# Patient Record
Sex: MALE | Race: WHITE | NOT HISPANIC OR LATINO | Employment: OTHER | URBAN - METROPOLITAN AREA
[De-identification: names, ages, dates, MRNs, and addresses within clinical notes are randomized per-mention and may not be internally consistent; named-entity substitution may affect disease eponyms.]

---

## 2017-03-31 PROBLEM — R91.8 PULMONARY NODULES: Status: ACTIVE | Noted: 2017-03-31

## 2017-08-16 PROBLEM — I48.91 A-FIB: Chronic | Status: ACTIVE | Noted: 2017-08-16

## 2017-08-16 PROBLEM — R93.89 ABNORMAL CT OF THE CHEST: Status: ACTIVE | Noted: 2017-08-16

## 2017-08-16 PROBLEM — J94.8 PLEURAL CALCIFICATION: Status: ACTIVE | Noted: 2017-08-16

## 2017-08-16 PROBLEM — I25.10 CORONARY ATHEROSCLEROSIS OF UNSPECIFIED TYPE OF VESSEL, NATIVE OR GRAFT: Status: ACTIVE | Noted: 2017-05-17

## 2018-11-20 PROBLEM — I65.23 BILATERAL CAROTID ARTERY STENOSIS: Status: ACTIVE | Noted: 2018-11-20

## 2018-11-20 PROBLEM — R42 VERTIGO: Status: ACTIVE | Noted: 2018-11-20

## 2018-11-20 PROBLEM — Z98.890 HISTORY OF COMMON CAROTID ARTERY STENT PLACEMENT: Status: ACTIVE | Noted: 2018-11-20

## 2018-11-20 PROBLEM — G25.2 INTENTION TREMOR: Status: ACTIVE | Noted: 2018-11-20

## 2018-11-20 PROBLEM — Z95.828 HISTORY OF COMMON CAROTID ARTERY STENT PLACEMENT: Status: ACTIVE | Noted: 2018-11-20

## 2019-05-21 PROBLEM — Z79.899 ON STATIN THERAPY: Status: ACTIVE | Noted: 2019-05-21

## 2019-05-21 PROBLEM — Z79.01 ANTICOAGULATED BY ANTICOAGULATION TREATMENT: Status: ACTIVE | Noted: 2019-05-21

## 2019-05-24 PROBLEM — R74.02 NONSPECIFIC ELEVATION OF LEVELS OF TRANSAMINASE OR LACTIC ACID DEHYDROGENASE (LDH): Status: ACTIVE | Noted: 2019-05-24

## 2019-05-24 PROBLEM — R74.01 NONSPECIFIC ELEVATION OF LEVELS OF TRANSAMINASE OR LACTIC ACID DEHYDROGENASE (LDH): Status: ACTIVE | Noted: 2019-05-24

## 2019-12-30 PROBLEM — C44.209: Status: ACTIVE | Noted: 2019-12-30

## 2019-12-30 PROBLEM — H90.6 MIXED CONDUCTIVE AND SENSORINEURAL HEARING LOSS OF BOTH EARS: Status: ACTIVE | Noted: 2019-12-30

## 2020-03-11 ENCOUNTER — OFFICE VISIT (OUTPATIENT)
Dept: URGENT CARE | Facility: CLINIC | Age: 80
End: 2020-03-11
Payer: MEDICARE

## 2020-03-11 VITALS
HEIGHT: 70 IN | RESPIRATION RATE: 16 BRPM | OXYGEN SATURATION: 96 % | SYSTOLIC BLOOD PRESSURE: 144 MMHG | TEMPERATURE: 97 F | DIASTOLIC BLOOD PRESSURE: 82 MMHG | WEIGHT: 215 LBS | BODY MASS INDEX: 30.78 KG/M2 | HEART RATE: 97 BPM

## 2020-03-11 DIAGNOSIS — J06.9 UPPER RESPIRATORY TRACT INFECTION, UNSPECIFIED TYPE: Primary | ICD-10-CM

## 2020-03-11 PROCEDURE — 96372 THER/PROPH/DIAG INJ SC/IM: CPT | Mod: S$GLB,,, | Performed by: INTERNAL MEDICINE

## 2020-03-11 PROCEDURE — 99214 PR OFFICE/OUTPT VISIT, EST, LEVL IV, 30-39 MIN: ICD-10-PCS | Mod: 25,S$GLB,, | Performed by: INTERNAL MEDICINE

## 2020-03-11 PROCEDURE — 99214 OFFICE O/P EST MOD 30 MIN: CPT | Mod: 25,S$GLB,, | Performed by: INTERNAL MEDICINE

## 2020-03-11 PROCEDURE — 96372 PR INJECTION,THERAP/PROPH/DIAG2ST, IM OR SUBCUT: ICD-10-PCS | Mod: S$GLB,,, | Performed by: INTERNAL MEDICINE

## 2020-03-11 RX ORDER — DEXAMETHASONE SODIUM PHOSPHATE 100 MG/10ML
10 INJECTION INTRAMUSCULAR; INTRAVENOUS
Status: COMPLETED | OUTPATIENT
Start: 2020-03-11 | End: 2020-03-11

## 2020-03-11 RX ORDER — PROMETHAZINE HYDROCHLORIDE AND DEXTROMETHORPHAN HYDROBROMIDE 6.25; 15 MG/5ML; MG/5ML
5 SYRUP ORAL 3 TIMES DAILY
Qty: 105 ML | Refills: 0 | Status: SHIPPED | OUTPATIENT
Start: 2020-03-11 | End: 2020-03-18

## 2020-03-11 RX ADMIN — DEXAMETHASONE SODIUM PHOSPHATE 10 MG: 100 INJECTION INTRAMUSCULAR; INTRAVENOUS at 01:03

## 2020-03-11 NOTE — PROGRESS NOTES
"Subjective:       Patient ID: Flaco Lucio Jr. is a 80 y.o. male.    Vitals:  height is 5' 10" (1.778 m) and weight is 97.5 kg (215 lb). His temperature is 97.1 °F (36.2 °C). His blood pressure is 144/82 (abnormal) and his pulse is 97. His respiration is 16 and oxygen saturation is 96%.     Chief Complaint: Sinus Problem    Pt c/o sinus symptoms x 4 days.     Sinus Problem   This is a new problem. The current episode started in the past 7 days. The problem is unchanged. There has been no fever. Associated symptoms include congestion, sinus pressure and sneezing. Pertinent negatives include no chills, coughing, diaphoresis, ear pain, shortness of breath or sore throat. Past treatments include nothing. The treatment provided no relief.       Constitution: Negative for chills, sweating, fatigue and fever.   HENT: Positive for congestion and sinus pressure. Negative for ear pain, sinus pain, sore throat and voice change.    Neck: Negative for painful lymph nodes.   Eyes: Negative for eye redness.   Respiratory: Negative for chest tightness, cough, sputum production, bloody sputum, COPD, shortness of breath, stridor, wheezing and asthma.    Gastrointestinal: Negative for nausea and vomiting.   Musculoskeletal: Negative for muscle ache.   Skin: Negative for rash.   Allergic/Immunologic: Positive for seasonal allergies and sneezing. Negative for asthma.   Hematologic/Lymphatic: Negative for swollen lymph nodes.       Objective:      Physical Exam   Constitutional: He is oriented to person, place, and time. He appears well-developed and well-nourished. He is cooperative.  Non-toxic appearance. He does not appear ill. No distress.   HENT:   Head: Normocephalic and atraumatic.   Right Ear: Hearing, tympanic membrane, external ear and ear canal normal.   Left Ear: Hearing, tympanic membrane, external ear and ear canal normal.   Nose: Rhinorrhea present. No mucosal edema or nasal deformity. No epistaxis. Right sinus exhibits " no maxillary sinus tenderness and no frontal sinus tenderness. Left sinus exhibits no maxillary sinus tenderness and no frontal sinus tenderness.   Mouth/Throat: Uvula is midline and mucous membranes are normal. No trismus in the jaw. Normal dentition. No uvula swelling. Posterior oropharyngeal erythema present.   Eyes: Conjunctivae and lids are normal. Right eye exhibits no discharge. Left eye exhibits no discharge. No scleral icterus.   Neck: Trachea normal, normal range of motion, full passive range of motion without pain and phonation normal. Neck supple.   Cardiovascular: Normal rate, regular rhythm, normal heart sounds, intact distal pulses and normal pulses.   Pulmonary/Chest: Effort normal and breath sounds normal. No respiratory distress.   Abdominal: Soft. Normal appearance and bowel sounds are normal. He exhibits no distension, no pulsatile midline mass and no mass. There is no tenderness.   Musculoskeletal: Normal range of motion. He exhibits no edema or deformity.   Neurological: He is alert and oriented to person, place, and time. He exhibits normal muscle tone. Coordination normal.   Skin: Skin is warm, dry, intact, not diaphoretic and not pale.   Psychiatric: He has a normal mood and affect. His speech is normal and behavior is normal. Judgment and thought content normal. Cognition and memory are normal.   Nursing note and vitals reviewed.        Assessment:       1. Upper respiratory tract infection, unspecified type        Plan:         Upper respiratory tract infection, unspecified type  -     dexamethasone injection 10 mg  -     promethazine-dextromethorphan (PROMETHAZINE-DM) 6.25-15 mg/5 mL Syrp; Take 5 mLs by mouth 3 (three) times daily. for 7 days  Dispense: 105 mL; Refill: 0      Patient Instructions     Viral Upper Respiratory Illness (Adult)  You have a viral upper respiratory illness (URI), which is another term for the common cold. This illness is contagious during the first few days. It  is spread through the air by coughing and sneezing. It may also be spread by direct contact (touching the sick person and then touching your own eyes, nose, or mouth). Frequent handwashing will decrease risk of spread. Most viral illnesses go away within 7 to 10 days with rest and simple home remedies. Sometimes the illness may last for several weeks. Antibiotics will not kill a virus, and they are generally not prescribed for this condition.    Home care  · If symptoms are severe, rest at home for the first 2 to 3 days. When you resume activity, don't let yourself get too tired.  · Avoid being exposed to cigarette smoke (yours or others).  · You may use acetaminophen or ibuprofen to control pain and fever, unless another medicine was prescribed. (Note: If you have chronic liver or kidney disease, have ever had a stomach ulcer or gastrointestinal bleeding, or are taking blood-thinning medicines, talk with your healthcare provider before using these medicines.) Aspirin should never be given to anyone under 18 years of age who is ill with a viral infection or fever. It may cause severe liver or brain damage.  · Your appetite may be poor, so a light diet is fine. Avoid dehydration by drinking 6 to 8 glasses of fluids per day (water, soft drinks, juices, tea, or soup). Extra fluids will help loosen secretions in the nose and lungs.  · Over-the-counter cold medicines will not shorten the length of time youre sick, but they may be helpful for the following symptoms: cough, sore throat, and nasal and sinus congestion. (Note: Do not use decongestants if you have high blood pressure.)  Follow-up care  Follow up with your healthcare provider, or as advised.  When to seek medical advice  Call your healthcare provider right away if any of these occur:  · Cough with lots of colored sputum (mucus)  · Severe headache; face, neck, or ear pain  · Difficulty swallowing due to throat pain  · Fever of 100.4°F (38°C)  Call 911, or get  immediate medical care  Call emergency services right away if any of these occur:  · Chest pain, shortness of breath, wheezing, or difficulty breathing  · Coughing up blood  · Inability to swallow due to throat pain  Date Last Reviewed: 9/13/2015  © 6483-2228 [employer]. [address] [place] [address]. All rights reserved. This information is not intended as a substitute for professional medical care. Always follow your healthcare professional's instructions.           My notes were dictated with M*Modal Fluency Software. Any misspellings or nonsensical grammar should be attributed to its use and allowances made for errors and typographic syntactical error(s).

## 2020-03-11 NOTE — PATIENT INSTRUCTIONS
Viral Upper Respiratory Illness (Adult)  You have a viral upper respiratory illness (URI), which is another term for the common cold. This illness is contagious during the first few days. It is spread through the air by coughing and sneezing. It may also be spread by direct contact (touching the sick person and then touching your own eyes, nose, or mouth). Frequent handwashing will decrease risk of spread. Most viral illnesses go away within 7 to 10 days with rest and simple home remedies. Sometimes the illness may last for several weeks. Antibiotics will not kill a virus, and they are generally not prescribed for this condition.    Home care  · If symptoms are severe, rest at home for the first 2 to 3 days. When you resume activity, don't let yourself get too tired.  · Avoid being exposed to cigarette smoke (yours or others).  · You may use acetaminophen or ibuprofen to control pain and fever, unless another medicine was prescribed. (Note: If you have chronic liver or kidney disease, have ever had a stomach ulcer or gastrointestinal bleeding, or are taking blood-thinning medicines, talk with your healthcare provider before using these medicines.) Aspirin should never be given to anyone under 18 years of age who is ill with a viral infection or fever. It may cause severe liver or brain damage.  · Your appetite may be poor, so a light diet is fine. Avoid dehydration by drinking 6 to 8 glasses of fluids per day (water, soft drinks, juices, tea, or soup). Extra fluids will help loosen secretions in the nose and lungs.  · Over-the-counter cold medicines will not shorten the length of time youre sick, but they may be helpful for the following symptoms: cough, sore throat, and nasal and sinus congestion. (Note: Do not use decongestants if you have high blood pressure.)  Follow-up care  Follow up with your healthcare provider, or as advised.  When to seek medical advice  Call your healthcare provider right away if any  of these occur:  · Cough with lots of colored sputum (mucus)  · Severe headache; face, neck, or ear pain  · Difficulty swallowing due to throat pain  · Fever of 100.4°F (38°C)  Call 911, or get immediate medical care  Call emergency services right away if any of these occur:  · Chest pain, shortness of breath, wheezing, or difficulty breathing  · Coughing up blood  · Inability to swallow due to throat pain  Date Last Reviewed: 9/13/2015  © 7930-3864 [employer]. [address] [place] [address]. All rights reserved. This information is not intended as a substitute for professional medical care. Always follow your healthcare professional's instructions.

## 2020-05-22 ENCOUNTER — LAB VISIT (OUTPATIENT)
Dept: PRIMARY CARE CLINIC | Facility: CLINIC | Age: 80
End: 2020-05-22
Payer: MEDICARE

## 2020-05-22 DIAGNOSIS — R05.9 COUGH: Primary | ICD-10-CM

## 2020-05-22 PROCEDURE — U0003 INFECTIOUS AGENT DETECTION BY NUCLEIC ACID (DNA OR RNA); SEVERE ACUTE RESPIRATORY SYNDROME CORONAVIRUS 2 (SARS-COV-2) (CORONAVIRUS DISEASE [COVID-19]), AMPLIFIED PROBE TECHNIQUE, MAKING USE OF HIGH THROUGHPUT TECHNOLOGIES AS DESCRIBED BY CMS-2020-01-R: HCPCS

## 2020-05-24 LAB — SARS-COV-2 RNA RESP QL NAA+PROBE: NOT DETECTED

## 2020-06-12 PROBLEM — R73.03 PREDIABETES: Chronic | Status: ACTIVE | Noted: 2020-06-12

## 2020-06-18 PROBLEM — Z79.82 LONG TERM (CURRENT) USE OF ASPIRIN: Status: ACTIVE | Noted: 2020-06-18

## 2020-06-18 PROBLEM — Z12.5 SCREENING FOR PROSTATE CANCER: Status: ACTIVE | Noted: 2020-06-18

## 2020-06-18 PROBLEM — C44.209: Status: RESOLVED | Noted: 2019-12-30 | Resolved: 2020-06-18

## 2020-06-18 PROBLEM — J06.9 URI (UPPER RESPIRATORY INFECTION): Status: RESOLVED | Noted: 2020-03-11 | Resolved: 2020-06-18

## 2024-05-24 PROBLEM — I25.10 CORONARY ARTERY DISEASE INVOLVING NATIVE CORONARY ARTERY OF NATIVE HEART WITHOUT ANGINA PECTORIS: Chronic | Status: ACTIVE | Noted: 2017-05-17

## 2024-05-24 PROBLEM — Z79.82 LONG TERM (CURRENT) USE OF ASPIRIN: Chronic | Status: ACTIVE | Noted: 2020-06-18

## 2024-05-24 PROBLEM — Z95.828 HISTORY OF COMMON CAROTID ARTERY STENT PLACEMENT: Chronic | Status: ACTIVE | Noted: 2018-11-20

## 2024-05-24 PROBLEM — I65.23 BILATERAL CAROTID ARTERY STENOSIS: Chronic | Status: ACTIVE | Noted: 2018-11-20

## 2024-05-24 PROBLEM — Z79.01 ANTICOAGULATED BY ANTICOAGULATION TREATMENT: Chronic | Status: ACTIVE | Noted: 2019-05-21

## 2024-05-24 PROBLEM — Z98.890 HISTORY OF COMMON CAROTID ARTERY STENT PLACEMENT: Chronic | Status: ACTIVE | Noted: 2018-11-20

## 2024-05-24 PROBLEM — R93.89 ABNORMAL CT OF THE CHEST: Chronic | Status: ACTIVE | Noted: 2017-08-16

## 2024-05-30 PROBLEM — E66.3 OVERWEIGHT WITH BODY MASS INDEX (BMI) OF 29 TO 29.9 IN ADULT: Chronic | Status: ACTIVE | Noted: 2024-05-30

## 2024-05-30 PROBLEM — F43.21 GRIEF REACTION: Status: ACTIVE | Noted: 2024-05-30

## 2024-05-30 PROBLEM — D69.2 OTHER NONTHROMBOCYTOPENIC PURPURA: Status: ACTIVE | Noted: 2024-05-30

## 2024-05-30 PROBLEM — F43.20 GRIEF REACTION: Status: ACTIVE | Noted: 2024-05-30

## 2024-09-20 PROBLEM — R94.8 ABNORMAL RADIONUCLIDE BONE SCAN: Status: ACTIVE | Noted: 2024-09-20

## 2024-09-28 PROBLEM — D69.2 OTHER NONTHROMBOCYTOPENIC PURPURA: Chronic | Status: ACTIVE | Noted: 2024-05-30

## 2024-09-28 PROBLEM — J94.8 PLEURAL CALCIFICATION: Chronic | Status: ACTIVE | Noted: 2017-08-16

## 2024-09-28 PROBLEM — R94.8 ABNORMAL RADIONUCLIDE BONE SCAN: Chronic | Status: ACTIVE | Noted: 2024-09-20

## 2024-09-30 ENCOUNTER — TELEPHONE (OUTPATIENT)
Dept: HEMATOLOGY/ONCOLOGY | Facility: CLINIC | Age: 84
End: 2024-09-30
Payer: MEDICARE

## 2024-09-30 NOTE — TELEPHONE ENCOUNTER
----- Message from Aleah sent at 9/30/2024  2:37 PM CDT -----  Type: Needs Medical Advice  Who Called:  pt son   Best Call Back Number: [phone]    Additional Information: Pt son is calling the office trying to get pt scheduled for an appt referral is placed in chart.

## 2024-10-01 ENCOUNTER — LAB VISIT (OUTPATIENT)
Dept: LAB | Facility: HOSPITAL | Age: 84
End: 2024-10-01
Attending: INTERNAL MEDICINE
Payer: MEDICARE

## 2024-10-01 ENCOUNTER — OFFICE VISIT (OUTPATIENT)
Dept: HEMATOLOGY/ONCOLOGY | Facility: CLINIC | Age: 84
End: 2024-10-01
Payer: MEDICARE

## 2024-10-01 VITALS
HEIGHT: 70 IN | OXYGEN SATURATION: 97 % | HEART RATE: 84 BPM | SYSTOLIC BLOOD PRESSURE: 138 MMHG | TEMPERATURE: 97 F | BODY MASS INDEX: 29.76 KG/M2 | DIASTOLIC BLOOD PRESSURE: 80 MMHG | RESPIRATION RATE: 17 BRPM | WEIGHT: 207.88 LBS

## 2024-10-01 DIAGNOSIS — R93.89 ABNORMAL CT OF THE CHEST: Chronic | ICD-10-CM

## 2024-10-01 DIAGNOSIS — R94.8 ABNORMAL RADIONUCLIDE BONE SCAN: Chronic | ICD-10-CM

## 2024-10-01 DIAGNOSIS — Z12.5 ENCOUNTER FOR SCREENING FOR MALIGNANT NEOPLASM OF PROSTATE: ICD-10-CM

## 2024-10-01 LAB
ALBUMIN SERPL BCP-MCNC: 3.6 G/DL (ref 3.5–5.2)
ALP SERPL-CCNC: 136 U/L (ref 55–135)
ALT SERPL W/O P-5'-P-CCNC: 21 U/L (ref 10–44)
ANION GAP SERPL CALC-SCNC: 10 MMOL/L (ref 8–16)
AST SERPL-CCNC: 22 U/L (ref 10–40)
BASOPHILS # BLD AUTO: 0.04 K/UL (ref 0–0.2)
BASOPHILS NFR BLD: 0.6 % (ref 0–1.9)
BILIRUB SERPL-MCNC: 0.7 MG/DL (ref 0.1–1)
BUN SERPL-MCNC: 15 MG/DL (ref 8–23)
CALCIUM SERPL-MCNC: 8.8 MG/DL (ref 8.7–10.5)
CHLORIDE SERPL-SCNC: 100 MMOL/L (ref 95–110)
CO2 SERPL-SCNC: 23 MMOL/L (ref 23–29)
CREAT SERPL-MCNC: 1.2 MG/DL (ref 0.5–1.4)
DIFFERENTIAL METHOD BLD: ABNORMAL
EOSINOPHIL # BLD AUTO: 0.1 K/UL (ref 0–0.5)
EOSINOPHIL NFR BLD: 1.8 % (ref 0–8)
ERYTHROCYTE [DISTWIDTH] IN BLOOD BY AUTOMATED COUNT: 13.3 % (ref 11.5–14.5)
EST. GFR  (NO RACE VARIABLE): 59.3 ML/MIN/1.73 M^2
GLUCOSE SERPL-MCNC: 106 MG/DL (ref 70–110)
HCT VFR BLD AUTO: 40.4 % (ref 40–54)
HGB BLD-MCNC: 14 G/DL (ref 14–18)
IMM GRANULOCYTES # BLD AUTO: 0.01 K/UL (ref 0–0.04)
IMM GRANULOCYTES NFR BLD AUTO: 0.1 % (ref 0–0.5)
LYMPHOCYTES # BLD AUTO: 1.7 K/UL (ref 1–4.8)
LYMPHOCYTES NFR BLD: 24.2 % (ref 18–48)
MCH RBC QN AUTO: 30.6 PG (ref 27–31)
MCHC RBC AUTO-ENTMCNC: 34.7 G/DL (ref 32–36)
MCV RBC AUTO: 88 FL (ref 82–98)
MONOCYTES # BLD AUTO: 0.8 K/UL (ref 0.3–1)
MONOCYTES NFR BLD: 11.2 % (ref 4–15)
NEUTROPHILS # BLD AUTO: 4.4 K/UL (ref 1.8–7.7)
NEUTROPHILS NFR BLD: 62.1 % (ref 38–73)
NRBC BLD-RTO: 0 /100 WBC
PLATELET # BLD AUTO: 224 K/UL (ref 150–450)
PMV BLD AUTO: 9.3 FL (ref 9.2–12.9)
POTASSIUM SERPL-SCNC: 4 MMOL/L (ref 3.5–5.1)
PROT SERPL-MCNC: 7.6 G/DL (ref 6–8.4)
RBC # BLD AUTO: 4.57 M/UL (ref 4.6–6.2)
SODIUM SERPL-SCNC: 133 MMOL/L (ref 136–145)
WBC # BLD AUTO: 7.07 K/UL (ref 3.9–12.7)

## 2024-10-01 PROCEDURE — 3079F DIAST BP 80-89 MM HG: CPT | Mod: CPTII,S$GLB,, | Performed by: INTERNAL MEDICINE

## 2024-10-01 PROCEDURE — 99999 PR PBB SHADOW E&M-EST. PATIENT-LVL IV: CPT | Mod: PBBFAC,,, | Performed by: INTERNAL MEDICINE

## 2024-10-01 PROCEDURE — 99204 OFFICE O/P NEW MOD 45 MIN: CPT | Mod: S$GLB,,, | Performed by: INTERNAL MEDICINE

## 2024-10-01 PROCEDURE — 1159F MED LIST DOCD IN RCRD: CPT | Mod: CPTII,S$GLB,, | Performed by: INTERNAL MEDICINE

## 2024-10-01 PROCEDURE — 3288F FALL RISK ASSESSMENT DOCD: CPT | Mod: CPTII,S$GLB,, | Performed by: INTERNAL MEDICINE

## 2024-10-01 PROCEDURE — 80053 COMPREHEN METABOLIC PANEL: CPT | Mod: PN | Performed by: INTERNAL MEDICINE

## 2024-10-01 PROCEDURE — G2211 COMPLEX E/M VISIT ADD ON: HCPCS | Mod: S$GLB,,, | Performed by: INTERNAL MEDICINE

## 2024-10-01 PROCEDURE — 84153 ASSAY OF PSA TOTAL: CPT | Performed by: INTERNAL MEDICINE

## 2024-10-01 PROCEDURE — 3075F SYST BP GE 130 - 139MM HG: CPT | Mod: CPTII,S$GLB,, | Performed by: INTERNAL MEDICINE

## 2024-10-01 PROCEDURE — 36415 COLL VENOUS BLD VENIPUNCTURE: CPT | Mod: PN | Performed by: INTERNAL MEDICINE

## 2024-10-01 PROCEDURE — 1101F PT FALLS ASSESS-DOCD LE1/YR: CPT | Mod: CPTII,S$GLB,, | Performed by: INTERNAL MEDICINE

## 2024-10-01 PROCEDURE — 1126F AMNT PAIN NOTED NONE PRSNT: CPT | Mod: CPTII,S$GLB,, | Performed by: INTERNAL MEDICINE

## 2024-10-01 PROCEDURE — 85025 COMPLETE CBC W/AUTO DIFF WBC: CPT | Mod: PN | Performed by: INTERNAL MEDICINE

## 2024-10-01 NOTE — PROGRESS NOTES
DIAGNOSIS CLINIC NOTE    Name: Flaco Lucio Jr.  MRN:  [id number]  :  1940 Age 84 y.o.  Date of Service: 10/1/2024    Reason for visit:  Flaco Lucio Jr. is a 84 y.o. male here regarding abnormal bone imaging .    ONCOLOGICAL HISTORY/HISTORY OF PRESENT ILLNESS:  Flaco Lucio Jr. has medical problems including    Flaco Lucio Jr. presents to diagnosis for evaluation of abnormal NM bone scan completed on 24    FINDINGS:  There is intense increase in tracer in the anterolateral aspects of the left 6 and 7th ribs.  There is increase in tracer throughout the thoracic and lumbar spine areas.  There is very intense increase in tracer in the sacrum and in the medial aspects of both iliac bones with findings more marked on the right.  There is some increase in the ribs on both sides.  There also are degenerative changes in both shoulders, both sternoclavicular joints, both hips, both knees, and both feet.  In the knee areas the findings are most marked on the right.  The kidneys are faintly visualized.  The partially filled bladder is unremarkable.      He has no reported no hip joint or hip bone pain.  He denies rib pain.  Reports he has left hand in hand pain which is troublesome to him because he is left-handed.  He has had no weight loss or weight gain in the last 6 months, stable weight.  No B symptoms.      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 Daniels    KNEE ARTHROSCOPY Right     right knee          Allergies as of 10/01/2024 - Reviewed 10/01/2024   Allergen Reaction Noted    Zocor [simvastatin] Other (See Comments) 06/25/2015    Levaquin [levofloxacin] Nausea Only 04/13/2017    Lipitor [atorvastatin] Other (See Comments) 06/25/2015       Family History   Problem Relation Name Age of Onset    Cancer Mother      Stroke Father      Heart disease Father         Social History     Tobacco Use    Smoking status: Never     Passive exposure: Never    Smokeless tobacco: Never   Substance Use Topics    Alcohol use: No    Drug use: Never         PHYSICAL EXAMINATION:  /80 (BP Location: Right arm, Patient Position: Sitting)   Pulse 84   Temp 97.2 °F (36.2 °C)   Resp 17   Ht 5' 10" (1.778 m)   Wt 94.3 kg (207 lb 14.3 oz)   SpO2 97%   BMI 29.83 kg/m²   Wt Readings from Last 3 Encounters:   10/01/24 94.3 kg (207 lb 14.3 oz)   09/30/24 93.5 kg (206 lb 1.6 oz)   08/28/24 94.6 kg (208 lb 8 oz)     ECOG PERFORMANCE STATUS: 2  General:  Well elderly appearing, nontoxic  Eyes:  Equal and round pupils, EOMI, no scleral icterus  Mouth:  No lesions, moist  Cardiovascular:  Warm, well-perfused, no peripheral edema  Lungs:  Unlabored on room air, no wheezing  Neurologic:  Awake, alert and oriented, participating in the exam  Psych:  Appropriate mood and affect  Skin:  Normal pallor, No rashes  Heme:  No petechiae, no purpura      LABORATORY:  CBC  Lab Results   Component Value Date    WBC 4.00 07/25/2024    HGB 15.4 07/25/2024    HCT 45.2 07/25/2024    MCV 89 07/25/2024     07/25/2024         BMP  Lab Results   Component Value Date     08/02/2024    K 4.8 08/02/2024     08/02/2024    CO2 28 08/02/2024    BUN 13 08/02/2024    CREATININE 1.25 (H) 08/02/2024    CALCIUM 8.9 08/02/2024    ANIONGAP 11 07/25/2024    ESTGFRAFRICA 66 05/16/2022    EGFRNONAA 57 (L) 05/16/2022 "           PERTINENT PATHOLOGY:  Nuclear medicine bone scan 09/19/2024    FINDINGS:    There is intense increase in tracer in the anterolateral aspects of the left 6 and 7th ribs.  There is increase in tracer throughout the thoracic and lumbar spine areas.  There is very intense increase in tracer in the sacrum and in the medial aspects of both iliac bones with findings more marked on the right.  There is some increase in the ribs on both sides.  There also are degenerative changes in both shoulders, both sternoclavicular joints, both hips, both knees, and both feet.  In the knee areas the findings are most marked on the right.  The kidneys are faintly visualized.  The partially filled bladder is unremarkable.     Impression:     There is increase in tracer in multiple areas as described.  The appearance is suspicious for metastatic bone disease.  The findings in the pelvis could also represent Paget's disease.  The findings in the anterolateral aspects of the left 6 and 7th ribs could also represent acute posttraumatic change.  Correlation with clinical and laboratory findings is suggested.         PERTINENT RADIOLOGY:          ASSESSMENT AND PLAN:    Flaco Lucio  is a 84 y.o. male with...    # abnormal bone scan  -has elevated alk-phos, has renal dysfunction  -symptoms are not concerning for malignancy as he has no pain in these areas, no B symptoms, reviewed the patient the option of pursuing PET scan versus proceeding with biopsy, after discussion he decided to proceed with biopsy for definitive answer  -should this be Paget's disease of the bone which I favor at this point would defer to primary care regarding bisphosphonate management vs other treatment modalities     # renal dysfunction-establishing care with Dr. Byrnes as scheduled    Please note, presumed malignancy symptom management prior to tissue diagnosis remains the responsibility of the referring physician, primary care, or emergency  department.  Symptom management will transfer to the treating oncologist after tissue diagnosis and initial visit with the treating oncologist.           Nasrin Parks M.D.  Hematology/Oncology     Route Chart for Scheduling    Med Onc Chart Routing      Follow up with physician 4 weeks.   Follow up with PETE    Infusion scheduling note    Injection scheduling note    Labs   Scheduling:  Preferred lab:  Lab interval:  Collect labs today   Imaging    Pharmacy appointment    Other referrals         Referal to CHUCK Vaughan for superificial bone biopsy

## 2024-10-02 LAB — COMPLEXED PSA SERPL-MCNC: 1.3 NG/ML (ref 0–4)

## 2024-10-03 ENCOUNTER — TELEPHONE (OUTPATIENT)
Dept: HEMATOLOGY/ONCOLOGY | Facility: CLINIC | Age: 84
End: 2024-10-03
Payer: MEDICARE

## 2024-10-03 DIAGNOSIS — R94.8 ABNORMAL RADIONUCLIDE BONE SCAN: Primary | ICD-10-CM

## 2024-10-03 NOTE — TELEPHONE ENCOUNTER
Per Dr Hanley in IR he would prefer a scan prior to biopsy . Dr Parks ordered PET/CT to be done. Spoke to daughter and scheduled this for 10/10. Will review with Dr Parks when resulted to proceed with biopsy

## 2024-10-07 DIAGNOSIS — R94.8 ABNORMAL RADIONUCLIDE BONE SCAN: Primary | ICD-10-CM

## 2024-10-10 ENCOUNTER — HOSPITAL ENCOUNTER (OUTPATIENT)
Dept: RADIOLOGY | Facility: HOSPITAL | Age: 84
Discharge: HOME | End: 2024-10-10
Attending: INTERNAL MEDICINE
Payer: MEDICARE

## 2024-10-10 DIAGNOSIS — R94.8 ABNORMAL RADIONUCLIDE BONE SCAN: ICD-10-CM

## 2024-10-10 LAB — GLUCOSE SERPL-MCNC: 94 MG/DL (ref 70–110)

## 2024-10-10 PROCEDURE — 78815 PET IMAGE W/CT SKULL-THIGH: CPT | Mod: 26,PI,, | Performed by: RADIOLOGY

## 2024-10-10 PROCEDURE — A9552 F18 FDG: HCPCS | Mod: PN | Performed by: INTERNAL MEDICINE

## 2024-10-10 PROCEDURE — 78815 PET IMAGE W/CT SKULL-THIGH: CPT | Mod: TC,PN

## 2024-10-10 RX ORDER — FLUDEOXYGLUCOSE F18 500 MCI/ML
13.1 INJECTION INTRAVENOUS
Status: COMPLETED | OUTPATIENT
Start: 2024-10-10 | End: 2024-10-10

## 2024-10-10 RX ADMIN — FLUDEOXYGLUCOSE F-18 13.1 MILLICURIE: 500 INJECTION INTRAVENOUS at 02:10

## 2024-10-10 NOTE — PROGRESS NOTES
PET Imaging Questionnaire    Are you a Diabetic? Recent Blood Sugar level? No    Are you anemic? Bone Marrow Stimulation Meds? No    Have you had a CT Scan, if so when & where was your last one? Yes -     Have you had a PET Scan, if so when & where was your last one? No    Chemotherapy or currently on Chemotherapy? No    Radiation therapy? No    Surgical History:   Past Surgical History:   Procedure Laterality Date    cardiac stents 2  05/2017    By Dr. Daniels    KNEE ARTHROSCOPY Right     right knee           Have you been fasting for at least 6 hours? Yes    Is there any chance you may be pregnant or breastfeeding? No    Assay: 13.6 MCi@:14:10   Injection Site:LT Forearm    Residual: 0.5 mCi@: 14:14   Technologist: Yao Lo Injected:13.1 mCi

## 2024-10-11 ENCOUNTER — OFFICE VISIT (OUTPATIENT)
Dept: NEPHROLOGY | Facility: CLINIC | Age: 84
End: 2024-10-11
Payer: MEDICARE

## 2024-10-11 ENCOUNTER — TELEPHONE (OUTPATIENT)
Dept: NEPHROLOGY | Facility: CLINIC | Age: 84
End: 2024-10-11

## 2024-10-11 VITALS — SYSTOLIC BLOOD PRESSURE: 124 MMHG | OXYGEN SATURATION: 97 % | DIASTOLIC BLOOD PRESSURE: 68 MMHG | HEART RATE: 74 BPM

## 2024-10-11 DIAGNOSIS — E87.1 HYPONATREMIA: Primary | ICD-10-CM

## 2024-10-11 DIAGNOSIS — N18.31 STAGE 3A CHRONIC KIDNEY DISEASE: ICD-10-CM

## 2024-10-11 DIAGNOSIS — E87.1 LOW SODIUM LEVELS: ICD-10-CM

## 2024-10-11 PROCEDURE — 99999 PR PBB SHADOW E&M-EST. PATIENT-LVL III: CPT | Mod: PBBFAC,,, | Performed by: STUDENT IN AN ORGANIZED HEALTH CARE EDUCATION/TRAINING PROGRAM

## 2024-10-11 NOTE — PROGRESS NOTES
Subjective:       Patient ID: Flaco Lucio Jr. is a 84 y.o. male who presents for new patient evaluation for hyponatremia.    Flaco Lucio Jr. is referred by GEORGIE Che Jr., MD to be evaluated for hyponatremia. He has ongoing medical conditions of htn, hld, a fib on long term anticoagulation, CAD, elevated BMI, hypothyroidism, CKD stage IIIa. We reviewed his recent lab trends at [facility]. It appears he has had mild hyponatremia since 12/2023. He is sent to nephrology clinic today for further evaluation of this.   Reviewed recent lab trends.    Thoroughly vital medication list   Recent cortisol and TSH within acceptable limits   Reviewed diet extensively with him.  He eats 2-3 full meals a day and has protein with each meal.  He drinks approximately 4 glasses of water per day, favors cola.  Denies any supplements or recent medication changes  Denies dyspnea on exertion, worsening chest pain or shortness of breath  He has BPH listed in his chart, he denies any secondary symptoms feels like he is emptying his bladder all the way.  Reports his urine output looks clear.      Review of Systems   Constitutional:  Negative for chills, diaphoresis and fever.   Respiratory:  Negative for cough and shortness of breath.    Cardiovascular:  Negative for chest pain and leg swelling.   Gastrointestinal:  Negative for abdominal pain, diarrhea, nausea and vomiting.   Genitourinary:  Negative for difficulty urinating, dysuria and hematuria.   Musculoskeletal:  Negative for myalgias.   Neurological:  Negative for headaches.   Hematological:  Does not bruise/bleed easily.       The past medical, family and social histories were reviewed for this encounter.     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 Daniels    KNEE ARTHROSCOPY Right     right knee        Social History     Socioeconomic History    Marital status:     Number of children: 2   Tobacco Use    Smoking status: Never     Passive exposure: Never    Smokeless tobacco: Never   Substance and Sexual Activity    Alcohol use: No    Drug use: Never    Sexual activity: Not Currently     Birth control/protection: None     Social Drivers of Health     Stress: No Stress Concern Present (12/21/2020)    [facility] [place] [facility] [employer] - Occupational Stress Questionnaire     Feeling of Stress : Only a little     Current Outpatient Medications   Medication Sig    amLODIPine (NORVASC) 10 MG tablet Take 0.5 tablets (5 mg total) by mouth once daily.    apixaban 5 mg Tab Take 5 mg by mouth 2 (two) times daily.    aspirin (ECOTRIN) 81 MG EC tablet Take 81 mg by mouth 3 (three) times a week.    clopidogreL (PLAVIX) 75 mg tablet Take 75 mg by mouth once.    ergocalciferol (ERGOCALCIFEROL) 50,000 unit Cap Take 1 capsule by mouth once a week    levothyroxine (SYNTHROID) 75 MCG tablet Take 1 tablet (75 mcg total) by mouth before breakfast.    losartan (COZAAR) 100 MG tablet losartan 100 mg tablet   Take 1 tablet by mouth once daily    metoprolol succinate (TOPROL-XL) 50 MG 24 hr tablet Take 50 mg by mouth once daily.    omeprazole (PRILOSEC OTC) 20 MG tablet Take 20 mg by mouth daily as needed.    pravastatin (PRAVACHOL) 80 MG tablet     vitamin E 200 UNIT capsule Take 200 Units by mouth once daily.     No current facility-administered medications for this  "visit.         Objective:   /68   Pulse 74   SpO2 97%      Physical Exam  Vitals reviewed.   Constitutional:       General: He is not in acute distress.     Appearance: He is obese.   HENT:      Head: Normocephalic and atraumatic.   Eyes:      General: No scleral icterus.     Extraocular Movements: Extraocular movements intact.   Cardiovascular:      Rate and Rhythm: Normal rate and regular rhythm.      Pulses: Normal pulses.      Heart sounds: Normal heart sounds.      No friction rub.   Pulmonary:      Effort: Pulmonary effort is normal. No respiratory distress.      Breath sounds: Normal breath sounds.   Abdominal:      General: Abdomen is protuberant.      Palpations: Abdomen is soft.   Musculoskeletal:         General: No swelling.      Cervical back: Normal range of motion. No tenderness.      Right lower leg: No edema.      Left lower leg: No edema.   Neurological:      General: No focal deficit present.      Mental Status: He is oriented to person, place, and time.      Motor: No weakness.   Psychiatric:         Mood and Affect: Mood normal.         Behavior: Behavior normal.         Assessment:     Lab Results   Component Value Date    CREATININE 1.2 10/01/2024    BUN 15 10/01/2024     (L) 10/01/2024    K 4.0 10/01/2024     10/01/2024    CO2 23 10/01/2024     Lab Results   Component Value Date    PTH 37 01/19/2022    CALCIUM 8.8 10/01/2024     Lab Results   Component Value Date    HCT 40.4 10/01/2024     No results found for: "UTPCR"    Lab Results   Component Value Date    MICALBCREAT 7 05/16/2022    MICALBCREAT 8 01/11/2022           1. Hyponatremia    2. Low sodium levels    3. Stage 3a chronic kidney disease        Plan:   Return to clinic in 2 months.  Labs for next visit include serum Osmo, urine Osmo, urine sodium, urine potassium, RF P, UA CR, U PCR, UA  Baseline creatinine is 1.0-1.2mg/dL.    Hyponatremia, chronic   -send for hyponatremia panel   -reviewed diet with the patient, " continue to increase protein intake   -reports he is on a low-sodium diet; instructed him to keep a food diary this weekend and use the nutrition fact guides to figure out how much sodium he is actually taking in, it may be well less than 2 g per day  -we can consider salt tablets, though at his age I think it would be more enjoyable to add salt to his diet, we will follow up workup prior to any dietary modification     Chronic kidney disease stage IIIA   -follow up urine protein assessments to gauge risk of progression  -on RASI for CKD-MACE risk reduction, proteinuria reduction and ministerio-protection    Hypertension  -bp trends reviewed; continue current regiment for now.    MBD evaluation   -recent bone scan concerning for Pagets disease; okay for bisphosphonates from renal perspective.         Tapan Byrnes MD  [facility] [place]    Part of this note has been created using [employer] dictation system. Errors in transcription may not be completely avoided.

## 2024-10-29 ENCOUNTER — TELEPHONE (OUTPATIENT)
Dept: HEMATOLOGY/ONCOLOGY | Facility: CLINIC | Age: 84
End: 2024-10-29
Payer: MEDICARE

## 2024-10-30 ENCOUNTER — TELEPHONE (OUTPATIENT)
Dept: HEMATOLOGY/ONCOLOGY | Facility: CLINIC | Age: 84
End: 2024-10-30
Payer: MEDICARE

## 2024-11-04 ENCOUNTER — OFFICE VISIT (OUTPATIENT)
Dept: HEMATOLOGY/ONCOLOGY | Facility: CLINIC | Age: 84
End: 2024-11-04
Payer: MEDICARE

## 2024-11-04 VITALS
SYSTOLIC BLOOD PRESSURE: 148 MMHG | OXYGEN SATURATION: 96 % | HEIGHT: 70 IN | DIASTOLIC BLOOD PRESSURE: 74 MMHG | WEIGHT: 210.75 LBS | TEMPERATURE: 98 F | RESPIRATION RATE: 16 BRPM | HEART RATE: 64 BPM | BODY MASS INDEX: 30.17 KG/M2

## 2024-11-04 DIAGNOSIS — R94.8 ABNORMAL RADIONUCLIDE BONE SCAN: Chronic | ICD-10-CM

## 2024-11-04 DIAGNOSIS — M88.9 PAGET DISEASE OF BONE: Primary | ICD-10-CM

## 2024-11-04 PROCEDURE — 1101F PT FALLS ASSESS-DOCD LE1/YR: CPT | Mod: CPTII,S$GLB,, | Performed by: INTERNAL MEDICINE

## 2024-11-04 PROCEDURE — 99213 OFFICE O/P EST LOW 20 MIN: CPT | Mod: S$GLB,,, | Performed by: INTERNAL MEDICINE

## 2024-11-04 PROCEDURE — 3288F FALL RISK ASSESSMENT DOCD: CPT | Mod: CPTII,S$GLB,, | Performed by: INTERNAL MEDICINE

## 2024-11-04 PROCEDURE — 99999 PR PBB SHADOW E&M-EST. PATIENT-LVL III: CPT | Mod: PBBFAC,,, | Performed by: INTERNAL MEDICINE

## 2024-11-04 PROCEDURE — 1126F AMNT PAIN NOTED NONE PRSNT: CPT | Mod: CPTII,S$GLB,, | Performed by: INTERNAL MEDICINE

## 2024-11-04 PROCEDURE — 3077F SYST BP >= 140 MM HG: CPT | Mod: CPTII,S$GLB,, | Performed by: INTERNAL MEDICINE

## 2024-11-04 PROCEDURE — 1159F MED LIST DOCD IN RCRD: CPT | Mod: CPTII,S$GLB,, | Performed by: INTERNAL MEDICINE

## 2024-11-04 PROCEDURE — 3078F DIAST BP <80 MM HG: CPT | Mod: CPTII,S$GLB,, | Performed by: INTERNAL MEDICINE

## 2024-11-04 NOTE — PROGRESS NOTES
"DIAGNOSIS CLINIC NOTE    Name: Flaco Lucio Jr.  MRN:  [id number]  :  1940 Age 84 y.o.  Date of Service: 2024    Reason for visit:  Flaco Lucio Jr. is a 84 y.o. male here regarding abnormal bone imaging .    ONCOLOGICAL HISTORY/HISTORY OF PRESENT ILLNESS:  Flaco Lucio Jr. has medical problems including    Flaco Lucio Jr. presents to diagnosis for evaluation of abnormal NM bone scan completed on 24    FINDINGS:  There is intense increase in tracer in the anterolateral aspects of the left 6 and 7th ribs.  There is increase in tracer throughout the thoracic and lumbar spine areas.  There is very intense increase in tracer in the sacrum and in the medial aspects of both iliac bones with findings more marked on the right.  There is some increase in the ribs on both sides.  There also are degenerative changes in both shoulders, both sternoclavicular joints, both hips, both knees, and both feet.  In the knee areas the findings are most marked on the right.  The kidneys are faintly visualized.  The partially filled bladder is unremarkable.    He has no reported no hip joint or hip bone pain.  He denies rib pain.  Reports he has left hand in hand pain which is troublesome to him because he is left-handed.  He has had no weight loss or weight gain in the last 6 months, stable weight.  No B symptoms.        PHYSICAL EXAMINATION:  BP (!) 148/74 (BP Location: Right arm, Patient Position: Sitting)   Pulse 64   Temp 97.9 °F (36.6 °C) (Temporal)   Resp 16   Ht 5' 10" (1.778 m)   Wt 95.6 kg (210 lb 12.2 oz)   SpO2 96%   BMI 30.24 kg/m²   Wt Readings from Last 3 Encounters:   24 95.6 kg (210 lb 12.2 oz)   10/24/24 95.3 kg (210 lb)   10/01/24 94.3 kg (207 lb 14.3 oz)     ECOG PERFORMANCE STATUS: 2  General:  Well elderly appearing, nontoxic  Eyes:  Equal and round pupils, EOMI, no scleral icterus  Mouth:  No lesions, moist  Cardiovascular:  Warm, well-perfused, no peripheral edema  Lungs:  " Unlabored on room air, no wheezing  Neurologic:  Awake, alert and oriented, participating in the exam  Psych:  Appropriate mood and affect  Skin:  Normal pallor, No rashes  Heme:  No petechiae, no purpura      LABORATORY:  CBC  Lab Results   Component Value Date    WBC 5.89 10/24/2024    HGB 15.1 10/24/2024    HCT 44.6 10/24/2024    MCV 89 10/24/2024     10/24/2024         BMP  Lab Results   Component Value Date     (L) 10/01/2024    K 4.0 10/01/2024     10/01/2024    CO2 23 10/01/2024    BUN 15 10/01/2024    CREATININE 1.2 10/01/2024    CALCIUM 8.8 10/01/2024    ANIONGAP 10 10/01/2024    ESTGFRAFRICA 66 05/16/2022    EGFRNONAA 57 (L) 05/16/2022           PERTINENT PATHOLOGY:  Nuclear medicine bone scan 09/19/2024    FINDINGS:    There is intense increase in tracer in the anterolateral aspects of the left 6 and 7th ribs.  There is increase in tracer throughout the thoracic and lumbar spine areas.  There is very intense increase in tracer in the sacrum and in the medial aspects of both iliac bones with findings more marked on the right.  There is some increase in the ribs on both sides.  There also are degenerative changes in both shoulders, both sternoclavicular joints, both hips, both knees, and both feet.  In the knee areas the findings are most marked on the right.  The kidneys are faintly visualized.  The partially filled bladder is unremarkable.     Impression:     There is increase in tracer in multiple areas as described.  The appearance is suspicious for metastatic bone disease.  The findings in the pelvis could also represent Paget's disease.  The findings in the anterolateral aspects of the left 6 and 7th ribs could also represent acute posttraumatic change.  Correlation with clinical and laboratory findings is suggested.         PERTINENT RADIOLOGY:          ASSESSMENT AND PLAN:    Flaco Ramirezmauri Rainey is a 84 y.o. male with...    # abnormal bone scan  -has elevated alk-phos, has renal  dysfunction  -symptoms are not concerning for malignancy as he has no pain in these areas, no B symptoms, reviewed the patient the option of pursuing PET scan versus proceeding with biopsy, after discussion he decided to proceed with biopsy for definitive answer  -should this be Paget's disease of the bone which I favor at this point would defer to primary care regarding bisphosphonate management vs other treatment modalities     11/01/2024 J[facility]/adryan   1, 2. RIGHT SACRAL BONE CORE NEEDLE BIOPSIES:   - IRREGULAR LAMELLAR BONE WITH PERITRABECULAR FIBROSIS AND OSTEOBLASTIC    ACTIVITY CONSISTENT     WITH PAGET'S DISEASE.   3. RIGHT SACRUM BONE CORE NEEDLE BIOPSY:   - UNREMARKABLE BONE MARROW.     # renal dysfunction-establishing care with Dr. Byrnes as scheduled    Please note, presumed malignancy symptom management prior to tissue diagnosis remains the responsibility of the referring physician, primary care, or emergency department.  Symptom management will transfer to the treating oncologist after tissue diagnosis and initial visit with the treating oncologist.           Nasrin Parks M.D.  Hematology/Oncology     Route Chart for Scheduling    Med Onc Chart Routing      Follow up with physician No follow up needed.   Follow up with PETE    Infusion scheduling note    Injection scheduling note    Labs    Imaging    Pharmacy appointment    Other referrals

## 2024-12-02 PROBLEM — E55.9 VITAMIN D DEFICIENCY: Status: ACTIVE | Noted: 2024-12-02

## 2024-12-19 ENCOUNTER — OFFICE VISIT (OUTPATIENT)
Dept: NEPHROLOGY | Facility: CLINIC | Age: 84
End: 2024-12-19
Payer: MEDICARE

## 2024-12-19 VITALS
HEIGHT: 70 IN | DIASTOLIC BLOOD PRESSURE: 56 MMHG | BODY MASS INDEX: 29.89 KG/M2 | SYSTOLIC BLOOD PRESSURE: 126 MMHG | HEART RATE: 68 BPM

## 2024-12-19 DIAGNOSIS — I10 ESSENTIAL HYPERTENSION: Chronic | ICD-10-CM

## 2024-12-19 DIAGNOSIS — E87.1 LOW SODIUM LEVELS: ICD-10-CM

## 2024-12-19 DIAGNOSIS — N18.31 STAGE 3A CHRONIC KIDNEY DISEASE: ICD-10-CM

## 2024-12-19 DIAGNOSIS — M88.9 PAGET DISEASE OF BONE: ICD-10-CM

## 2024-12-19 DIAGNOSIS — E87.1 HYPONATREMIA: Primary | ICD-10-CM

## 2024-12-19 PROCEDURE — 3078F DIAST BP <80 MM HG: CPT | Mod: CPTII,S$GLB,, | Performed by: STUDENT IN AN ORGANIZED HEALTH CARE EDUCATION/TRAINING PROGRAM

## 2024-12-19 PROCEDURE — 99999 PR PBB SHADOW E&M-EST. PATIENT-LVL II: CPT | Mod: PBBFAC,,, | Performed by: STUDENT IN AN ORGANIZED HEALTH CARE EDUCATION/TRAINING PROGRAM

## 2024-12-19 PROCEDURE — 3074F SYST BP LT 130 MM HG: CPT | Mod: CPTII,S$GLB,, | Performed by: STUDENT IN AN ORGANIZED HEALTH CARE EDUCATION/TRAINING PROGRAM

## 2024-12-19 PROCEDURE — 99214 OFFICE O/P EST MOD 30 MIN: CPT | Mod: S$GLB,,, | Performed by: STUDENT IN AN ORGANIZED HEALTH CARE EDUCATION/TRAINING PROGRAM

## 2024-12-19 PROCEDURE — 1159F MED LIST DOCD IN RCRD: CPT | Mod: CPTII,S$GLB,, | Performed by: STUDENT IN AN ORGANIZED HEALTH CARE EDUCATION/TRAINING PROGRAM

## 2024-12-19 NOTE — PROGRESS NOTES
Subjective:       Patient ID: Flaco Lucio Jr. is a 84 y.o. male who returns for ongoing evaluation and management of CKD.     He has ongoing medical conditions of htn, hld, a fib on long term anticoagulation, CAD, elevated BMI, hypothyroidism, PAGETs disease, CKD stage IIIa. We reviewed his recent lab trends at chairside. It appears he has had mild hyponatremia since 12/2023. He is sent to nephrology clinic today for further evaluation of this.   Reviewed recent lab trends.    Thoroughly vital medication list   Recent cortisol and TSH within acceptable limits   Reviewed diet extensively with him.  He eats 2-3 full meals a day and has protein with each meal.  He drinks approximately 4 glasses of water per day, favors cola.  Denies any supplements or recent medication changes  Denies dyspnea on exertion, worsening chest pain or shortness of breath  He has BPH listed in his chart, he denies any secondary symptoms feels like he is emptying his bladder all the way.  Reports his urine output looks clear.    Interval history:  12/19/24 - doing well. Here for f/u of hyponatremia. Denies acute complaints. Reports good appetite and increased protein/solute intake. Drinking less free water and more opaque fluids.  We reviewed recent labs at chairside.  Renal function based on serum creatinine trend remains at baseline.  sNa is 131 mEq/L (130-135mEq/L at baseline)  sOsm is 290mOsm/kg. Thus, isotonic hyponatremia.       Review of Systems   Constitutional:  Negative for chills, diaphoresis and fever.   Respiratory:  Negative for cough and shortness of breath.    Cardiovascular:  Negative for chest pain and leg swelling.   Gastrointestinal:  Negative for abdominal pain, diarrhea, nausea and vomiting.   Genitourinary:  Negative for difficulty urinating, dysuria and hematuria.   Musculoskeletal:  Negative for myalgias.   Neurological:  Negative for headaches.   Hematological:  Does not bruise/bleed easily.       The past medical,  "family and social histories were reviewed for this encounter.     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    apixaban 5 mg Tab Take 5 mg by mouth 2 (two) times daily.    aspirin (ECOTRIN) 81 MG EC tablet Take 81 mg by mouth 3 (three) times a week.    clopidogreL (PLAVIX) 75 mg tablet Take 75 mg by mouth once.    ergocalciferol (ERGOCALCIFEROL) 50,000 unit Cap Take 1 capsule by mouth once a week    levothyroxine (SYNTHROID) 75 MCG tablet Take 1 tablet (75 mcg total) by mouth before breakfast.    losartan (COZAAR) 100 MG tablet losartan 100 mg tablet   Take 1 tablet by mouth once daily    omeprazole (PRILOSEC OTC) 20 MG tablet Take 20 mg by mouth daily as needed.    pravastatin (PRAVACHOL) 80 MG tablet     vitamin E 200 UNIT capsule Take 200 Units by mouth once daily.     No current facility-administered medications for this visit.         Objective:   BP (!) 126/56 (BP Location: Left arm, Patient Position: Sitting)   Pulse 68   Ht 5' 10" (1.778 m)   BMI 29.89 kg/m²      Physical Exam  Vitals reviewed.   Constitutional:       General: He is not in acute distress.     Appearance: He is obese.   Cardiovascular:      Pulses: Normal pulses.      Heart sounds: Normal heart sounds.      No friction rub.   Pulmonary:      Effort: Pulmonary effort is " normal. No respiratory distress.      Breath sounds: Normal breath sounds.   Abdominal:      General: Abdomen is protuberant.      Palpations: Abdomen is soft.   Musculoskeletal:         General: No swelling.      Cervical back: Normal range of motion. No tenderness.      Right lower leg: No edema.      Left lower leg: No edema.   Neurological:      General: No focal deficit present.      Mental Status: He is oriented to person, place, and time.      Motor: No weakness.   Psychiatric:         Mood and Affect: Mood normal.         Behavior: Behavior normal.         Assessment:     Lab Results   Component Value Date    CREATININE 1.24 12/02/2024    BUN 19 12/02/2024     (L) 12/02/2024    K 5.0 12/02/2024     12/02/2024    CO2 27 12/02/2024     Lab Results   Component Value Date    PTH 37 01/19/2022    CALCIUM 9.5 12/02/2024    PHOS 3.8 12/02/2024     Lab Results   Component Value Date    HCT 44.6 10/24/2024     Prot/Creat Ratio, Urine   Date Value Ref Range Status   12/02/2024 76.6 (H) 15.0 - 68.0 mg/g Final       Lab Results   Component Value Date    MICALBCREAT 8.2 12/02/2024    MICALBCREAT 7 05/16/2022    MICALBCREAT 8 01/11/2022           No diagnosis found.      Plan:   Return to clinic in 6 months.  Labs for next visit include serum Osmo, urine Osmo, urine sodium, urine potassium, RFP,   Baseline creatinine is 1.0-1.2mg/dL.    Isotonic Hyponatremia, chronic   -continues to have isotonic hyponatremia, clinically asymptomatic   -reviewed diet with the patient.  Increase solute in the diet.  He is limiting his free water intake to less than 1 L a day but is able to have more with opaque fluids (milk, juice)  -lipid panel within normal limits   -check SPEP for history of paraproteinemia    Chronic kidney disease stage IIIA   -likely related to age/longstanding hypertension   -on RASI for CKD-MACE risk reduction, proteinuria reduction and ministerio-protection  -low risk of progression based on KFRE  model    Hypertension  -bp trends reviewed; medication list reviewed; continue current regiment for now.    MBD evaluation   -recent bone scan concerning for Pagets disease; dx confirmed. He has not started therapy for this yet and is under surveillance.     __________________________  Tapan Byrnes MD  [facility] [place]    Part of this note has been created using [employer] dictation system. Errors in transcription may not be completely avoided.    KFRE 2-Year: 0.1% at 12/2/2024 11:38 AM  Calculated from:  Serum Creatinine: 1.24 mg/dL at 12/2/2024 11:29 AM  Urine Albumin Creatinine Ratio: 8.2 ug/mg at 12/2/2024 11:38 AM  Age: 84 years  Sex: Male at 12/2/2024 11:38 AM  Has CKD-3 to CKD-5: No    KFRE 5-Year: 0.2% at 12/2/2024 11:38 AM  Calculated from:  Serum Creatinine: 1.24 mg/dL at 12/2/2024 11:29 AM  Urine Albumin Creatinine Ratio: 8.2 ug/mg at 12/2/2024 11:38 AM  Age: 84 years  Sex: Male at 12/2/2024 11:38 AM  Has CKD-3 to CKD-5: No

## 2025-06-03 PROBLEM — E55.9 VITAMIN D DEFICIENCY: Chronic | Status: ACTIVE | Noted: 2024-12-02

## 2025-06-05 PROBLEM — H91.13 PRESBYCUSIS OF BOTH EARS: Chronic | Status: ACTIVE | Noted: 2025-06-05

## 2025-06-19 ENCOUNTER — OFFICE VISIT (OUTPATIENT)
Dept: NEPHROLOGY | Facility: CLINIC | Age: 85
End: 2025-06-19
Payer: MEDICARE

## 2025-06-19 VITALS
HEIGHT: 70 IN | OXYGEN SATURATION: 97 % | WEIGHT: 211.44 LBS | BODY MASS INDEX: 30.27 KG/M2 | DIASTOLIC BLOOD PRESSURE: 68 MMHG | HEART RATE: 65 BPM | SYSTOLIC BLOOD PRESSURE: 108 MMHG

## 2025-06-19 DIAGNOSIS — M88.9 PAGET DISEASE OF BONE: ICD-10-CM

## 2025-06-19 DIAGNOSIS — I10 ESSENTIAL HYPERTENSION: ICD-10-CM

## 2025-06-19 DIAGNOSIS — E87.1 HYPONATREMIA: ICD-10-CM

## 2025-06-19 DIAGNOSIS — D89.2 PARAPROTEINEMIA: ICD-10-CM

## 2025-06-19 DIAGNOSIS — N18.31 STAGE 3A CHRONIC KIDNEY DISEASE: Primary | ICD-10-CM

## 2025-06-19 PROCEDURE — 99999 PR PBB SHADOW E&M-EST. PATIENT-LVL III: CPT | Mod: PBBFAC,,, | Performed by: STUDENT IN AN ORGANIZED HEALTH CARE EDUCATION/TRAINING PROGRAM

## 2025-06-19 PROCEDURE — 1159F MED LIST DOCD IN RCRD: CPT | Mod: CPTII,S$GLB,, | Performed by: STUDENT IN AN ORGANIZED HEALTH CARE EDUCATION/TRAINING PROGRAM

## 2025-06-19 PROCEDURE — 99214 OFFICE O/P EST MOD 30 MIN: CPT | Mod: S$GLB,,, | Performed by: STUDENT IN AN ORGANIZED HEALTH CARE EDUCATION/TRAINING PROGRAM

## 2025-06-19 NOTE — PROGRESS NOTES
[facility]  Nephrology Clinic    Subjective:       HPI ID: Flaco Lucio Jr. is a 85 y.o. male who returns for ongoing evaluation and management of CKD.     He has ongoing medical conditions of htn, hld, a fib on long term anticoagulation, CAD, elevated BMI, hypothyroidism, PAGETs disease, CKD stage IIIa. We reviewed his recent lab trends at chairside. It appears he has had mild hyponatremia since 12/2023. He is sent to nephrology clinic today for further evaluation of this.   Reviewed recent lab trends.    Thoroughly vital medication list   Recent cortisol and TSH within acceptable limits   Reviewed diet extensively with him.  He eats 2-3 full meals a day and has protein with each meal.  He drinks approximately 4 glasses of water per day, favors cola.  Denies any supplements or recent medication changes  Denies dyspnea on exertion, worsening chest pain or shortness of breath  He has BPH listed in his chart, he denies any secondary symptoms feels like he is emptying his bladder all the way.  Reports his urine output looks clear.    Interval history:  12/19/24 - doing well. Here for f/u of hyponatremia. Denies acute complaints. Reports good appetite and increased protein/solute intake. Drinking less free water and more opaque fluids.  We reviewed recent labs at chairside.  Renal function based on serum creatinine trend remains at baseline.  sNa is 131 mEq/L (130-135mEq/L at baseline)  sOsm is 290mOsm/kg. Thus, isotonic hyponatremia.     06/19/25 - patient seen today for f/u. Feels well. Denies acute complaints. Appetite is good. He is working on his protein intake. Drinking opaque fluids. We reviewed recent labs at chairside.  Renal function based on serum creatinine trend remains at baseline. He did not obtain pre-visit urine studies.   S/p pacemaker placement in the interim.       Review of Systems   Constitutional:  Negative for chills, diaphoresis and fever.   Respiratory:  Negative for  cough and shortness of breath.    Cardiovascular:  Negative for chest pain and leg swelling.   Gastrointestinal:  Negative for abdominal pain, diarrhea, nausea and vomiting.   Genitourinary:  Negative for difficulty urinating, dysuria and hematuria.   Musculoskeletal:  Negative for myalgias.   Neurological:  Negative for headaches.   Hematological:  Does not bruise/bleed easily.       The past medical, family and social histories were reviewed for this encounter.     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    aspirin (ECOTRIN) 81 MG EC tablet Take 81 mg by mouth 3 (three) times a week.    ergocalciferol (ERGOCALCIFEROL) 50,000 unit Cap Take 1 capsule by mouth once a week    levothyroxine (SYNTHROID) 75 MCG tablet Take 1 tablet (75 mcg total) by mouth before breakfast.    losartan (COZAAR) 100 MG tablet Take 100 mg by mouth every evening.    metoprolol succinate (TOPROL-XL) 25 MG 24 hr tablet Take 12.5 mg by mouth once daily.    omeprazole (PRILOSEC OTC) 20 MG tablet Take 20 mg by mouth once daily.    pravastatin (PRAVACHOL) 80 MG tablet Take 80 mg by mouth every evening.    vitamin E 200 UNIT capsule Take 200 Units by mouth once daily.     No current facility-administered medications for this visit.         Objective:   /68  "(BP Location: Left arm, Patient Position: Sitting)   Pulse 65   Ht 5' 10" (1.778 m)   Wt 95.9 kg (211 lb 6.7 oz)   SpO2 97%   BMI 30.34 kg/m²      Physical Exam  Vitals reviewed.   Constitutional:       General: He is not in acute distress.     Appearance: He is obese.   Cardiovascular:      Pulses: Normal pulses.      Heart sounds: Normal heart sounds.      No friction rub.   Pulmonary:      Effort: Pulmonary effort is normal. No respiratory distress.      Breath sounds: Normal breath sounds.   Abdominal:      General: Abdomen is protuberant.      Palpations: Abdomen is soft.   Musculoskeletal:         General: No swelling.      Right lower leg: No edema.      Left lower leg: No edema.   Neurological:      Mental Status: He is oriented to person, place, and time.      Motor: No weakness.   Psychiatric:         Mood and Affect: Mood normal.         Behavior: Behavior normal.         Assessment:     Lab Results   Component Value Date    CREATININE 1.08 06/05/2025    BUN 18 06/05/2025     06/05/2025    K 4.5 06/05/2025     06/05/2025    CO2 25 06/05/2025     Lab Results   Component Value Date    PTH 37 01/19/2022    CALCIUM 9.6 06/05/2025    PHOS 3.8 12/02/2024     Lab Results   Component Value Date    HCT 40.1 02/05/2025     Prot/Creat Ratio, Urine   Date Value Ref Range Status   12/02/2024 76.6 (H) 15.0 - 68.0 mg/g Final       Lab Results   Component Value Date    MICALBCREAT 8.2 12/02/2024    MICALBCREAT 7 05/16/2022    MICALBCREAT 8 01/11/2022           1. Stage 3a chronic kidney disease    2. Hyponatremia, isotonic    3. Paget disease of bone    4. Essential hypertension    5. Paraproteinemia          Plan:   Return to clinic in 6 months.  Baseline creatinine is 1.0-1.2mg/dL.  Orders Placed This Encounter   Procedures    Protein electrophoresis, serum    Osmolality, Serum    Microalbumin/Creatinine Ratio, Urine    PTH, Intact    Renal Function Panel    Protein / creatinine ratio, urine "     Isotonic Hyponatremia, chronic   -continues to have isotonic hyponatremia, clinically asymptomatic   -reviewed diet with the patient.  Increase solute in the diet.  He is limiting his free water intake to less than 1 L a day but is able to have more with opaque fluids (milk, juice)  -lipid panel within normal limits   -check SPEP for history of paraproteinemia    CKD G3a / A1  -likely related to age/longstanding hypertension   -on RASI for CKD-MACE risk reduction, proteinuria reduction and ministerio-protection  -low risk of progression based on KFRE model    Hypertension  -bp trends reviewed; medication list reviewed; continue current regiment for now.    MBD evaluation   -recent bone scan concerning for Pagets disease; dx confirmed. He has not started therapy for this yet and is under surveillance.     __________________________  Tapan Byrnes MD  [facility]    Part of this note has been created using [employer] dictation system. Errors in transcription may not be completely avoided.    Computed KFRE 2-Year unavailable. One or more values for this score either were not found within the given timeframe or did not fit some other criterion.    Computed KFRE 5-Year unavailable. One or more values for this score either were not found within the given timeframe or did not fit some other criterion.